# Patient Record
Sex: FEMALE | Race: ASIAN | NOT HISPANIC OR LATINO | Employment: FULL TIME | URBAN - NONMETROPOLITAN AREA
[De-identification: names, ages, dates, MRNs, and addresses within clinical notes are randomized per-mention and may not be internally consistent; named-entity substitution may affect disease eponyms.]

---

## 2022-05-03 ENCOUNTER — TELEPHONE (OUTPATIENT)
Dept: FAMILY MEDICINE CLINIC | Facility: CLINIC | Age: 62
End: 2022-05-03

## 2022-05-03 RX ORDER — LANCETS
1 EACH MISCELLANEOUS 2 TIMES DAILY
Qty: 200 EACH | Refills: 6 | Status: SHIPPED | OUTPATIENT
Start: 2022-05-03

## 2022-05-03 RX ORDER — LANCETS
EACH MISCELLANEOUS
COMMUNITY
End: 2022-05-03 | Stop reason: SDUPTHER

## 2022-05-03 NOTE — TELEPHONE ENCOUNTER
PT WALKED IN AND SHE IS NEEDING REFILLS ON THESE MEDICATIONS     ACCU-CHEK RACIEL PLUSTEST STRIPS     AND   ACCU-CHEK SOFTCLIX LANCETS    PLEASE ADVISE PT SHE IS ALMOST OUT

## 2022-05-14 ENCOUNTER — TELEPHONE (OUTPATIENT)
Dept: FAMILY MEDICINE CLINIC | Facility: CLINIC | Age: 62
End: 2022-05-14

## 2022-05-19 RX ORDER — METFORMIN HYDROCHLORIDE 750 MG/1
750 TABLET, EXTENDED RELEASE ORAL 2 TIMES DAILY
COMMUNITY
Start: 2022-02-10 | End: 2022-05-19 | Stop reason: SDUPTHER

## 2022-05-19 RX ORDER — METFORMIN HYDROCHLORIDE 750 MG/1
750 TABLET, EXTENDED RELEASE ORAL 2 TIMES DAILY
Qty: 180 TABLET | Refills: 1 | Status: SHIPPED | OUTPATIENT
Start: 2022-05-19 | End: 2022-05-23

## 2022-05-20 ENCOUNTER — TELEPHONE (OUTPATIENT)
Dept: FAMILY MEDICINE CLINIC | Facility: CLINIC | Age: 62
End: 2022-05-20

## 2022-05-23 RX ORDER — METFORMIN HYDROCHLORIDE 750 MG/1
TABLET, EXTENDED RELEASE ORAL
Qty: 90 TABLET | Refills: 0 | Status: SHIPPED | OUTPATIENT
Start: 2022-05-23

## 2022-06-29 RX ORDER — SIMVASTATIN 20 MG
TABLET ORAL
Qty: 90 TABLET | Refills: 1 | Status: SHIPPED | OUTPATIENT
Start: 2022-06-29

## 2022-08-12 RX ORDER — LISINOPRIL 5 MG/1
TABLET ORAL
Qty: 90 TABLET | OUTPATIENT
Start: 2022-08-12

## 2022-08-15 ENCOUNTER — TELEPHONE (OUTPATIENT)
Dept: FAMILY MEDICINE CLINIC | Facility: CLINIC | Age: 62
End: 2022-08-15

## 2022-08-15 NOTE — TELEPHONE ENCOUNTER
Caller: Calvin Kerns    Relationship: Self    Best call back number: [phone]    Requested Prescriptions:   LISINOPRIL 5MG     Pharmacy where request should be sent: [facility] [address] [place] [address], [place] - [address] [facility] AT [facility] [address] - [phone]  - [phone]     Additional details provided by patient: PATIENT HAS 1 DAY SUPPLY LEFT    Does the patient have less than a 3 day supply:  [x] Yes  [] No    Richelle Guajardo Rep   08/15/22 16:16 EDT

## 2022-08-16 RX ORDER — LISINOPRIL 5 MG/1
TABLET ORAL
COMMUNITY
Start: 2022-05-10 | End: 2022-08-16 | Stop reason: SDUPTHER

## 2022-08-16 RX ORDER — LISINOPRIL 5 MG/1
5 TABLET ORAL DAILY
Qty: 30 TABLET | Refills: 0 | Status: SHIPPED | OUTPATIENT
Start: 2022-08-16 | End: 2022-09-12

## 2022-08-16 NOTE — TELEPHONE ENCOUNTER
Caller: Calvin Kerns    Relationship: Self    Best call back number: [phone]    What medication are you requesting: LISINOPRIL 5 MG ONE TABLET DAILY AT A 90 DAY SUPPLY     What are your current symptoms: MAINTENANCE MEDICATION      How long have you been experiencing symptoms: UNSURE - THE PATIENT REPORTS SHE HAS BEEN ON THIS MEDICATION FOR A FEW YEARS AND HER PREVIOUS PCP PRESCRIBED THIS FOR HER. THE PATIENT MADE AN APPOINTMENT FOR HER  PHYSICAL FOR 09/28/2022 AND IS REQUESTING THIS MEDICATION IN THE MEANTIME.       Have you had these symptoms before:    [x] Yes  [] No    Have you been treated for these symptoms before:   [x] Yes  [] No    If a prescription is needed, what is your preferred pharmacy and phone number: [facility] - [place], [place] - [address] [facility] AT [facility] [address] & [address] - [phone]  - [phone] FX     Additional notes:    PLEASE CALL THE PATIENT IF ANY QUESTIONS -[phone]

## 2022-09-12 RX ORDER — LISINOPRIL 5 MG/1
TABLET ORAL
Qty: 30 TABLET | Refills: 0 | Status: SHIPPED | OUTPATIENT
Start: 2022-09-12

## 2023-03-08 ENCOUNTER — OFFICE VISIT (OUTPATIENT)
Dept: FAMILY MEDICINE CLINIC | Facility: CLINIC | Age: 63
End: 2023-03-08
Payer: COMMERCIAL

## 2023-03-08 VITALS
HEART RATE: 84 BPM | BODY MASS INDEX: 25.05 KG/M2 | SYSTOLIC BLOOD PRESSURE: 130 MMHG | HEIGHT: 61 IN | WEIGHT: 132.7 LBS | OXYGEN SATURATION: 98 % | TEMPERATURE: 98 F | DIASTOLIC BLOOD PRESSURE: 72 MMHG

## 2023-03-08 DIAGNOSIS — R31.0 GROSS HEMATURIA: ICD-10-CM

## 2023-03-08 DIAGNOSIS — R10.9 ACUTE RIGHT FLANK PAIN: Primary | ICD-10-CM

## 2023-03-08 PROBLEM — E11.9 DIABETES MELLITUS: Status: ACTIVE | Noted: 2023-03-08

## 2023-03-08 PROBLEM — R03.0 ELEVATED BLOOD PRESSURE READING: Status: ACTIVE | Noted: 2023-03-08

## 2023-03-08 LAB
BILIRUB BLD-MCNC: NEGATIVE MG/DL
CLARITY, POC: CLEAR
COLOR UR: YELLOW
EXPIRATION DATE: ABNORMAL
GLUCOSE UR STRIP-MCNC: NEGATIVE MG/DL
KETONES UR QL: NEGATIVE
LEUKOCYTE EST, POC: NEGATIVE
Lab: ABNORMAL
NITRITE UR-MCNC: NEGATIVE MG/ML
PH UR: 7 [PH] (ref 5–8)
PROT UR STRIP-MCNC: NEGATIVE MG/DL
RBC # UR STRIP: ABNORMAL /UL
SP GR UR: 1.01 (ref 1–1.03)
UROBILINOGEN UR QL: ABNORMAL

## 2023-03-08 PROCEDURE — 81003 URINALYSIS AUTO W/O SCOPE: CPT | Performed by: FAMILY MEDICINE

## 2023-03-08 PROCEDURE — 96372 THER/PROPH/DIAG INJ SC/IM: CPT | Performed by: FAMILY MEDICINE

## 2023-03-08 PROCEDURE — 99213 OFFICE O/P EST LOW 20 MIN: CPT | Performed by: FAMILY MEDICINE

## 2023-03-08 RX ORDER — TAMSULOSIN HYDROCHLORIDE 0.4 MG/1
1 CAPSULE ORAL DAILY
Qty: 30 CAPSULE | Refills: 0 | Status: SHIPPED | OUTPATIENT
Start: 2023-03-08

## 2023-03-08 RX ORDER — DORZOLAMIDE HCL 20 MG/ML
SOLUTION/ DROPS OPHTHALMIC
COMMUNITY
Start: 2023-01-15

## 2023-03-08 RX ORDER — KETOROLAC TROMETHAMINE 30 MG/ML
60 INJECTION, SOLUTION INTRAMUSCULAR; INTRAVENOUS ONCE
Status: COMPLETED | OUTPATIENT
Start: 2023-03-08 | End: 2023-03-08

## 2023-03-08 RX ADMIN — KETOROLAC TROMETHAMINE 60 MG: 30 INJECTION, SOLUTION INTRAMUSCULAR; INTRAVENOUS at 08:46

## 2023-03-08 NOTE — ASSESSMENT & PLAN NOTE
I am concerned for nephrolithiasis with 2 urinalyses with blood only.  She received Toradol in office today and will have KUB done in office to further evaluate.  She was instructed to go to the ED should symptoms acutely worsen.

## 2023-03-08 NOTE — PROGRESS NOTES
Date: 2023   Patient Name: Calvin Kerns  : 1960   MRN: [id number]     Chief Complaint:    Chief Complaint   Patient presents with   • Flank Pain     Right side, radiates into back       History of Present Illness: Calvin Kerns is a 62 y.o. female who is here today for Right flank pain.  Symptoms started about 1 week ago and she reports right flank pain radiating to the anterior abdomen towards her groin.  She was seen at urgent care and urinalysis had large amount of blood.  She reports symptoms have continued to worsen and pain has become unbearable at times.  She has no history of nephrolithiasis and denies dysuria, urinary frequency or urgency.           Review of Systems:   Review of Systems   Constitutional: Negative for fever and unexpected weight loss.   Cardiovascular: Negative for chest pain.   Gastrointestinal: Negative for abdominal pain.   Genitourinary: Positive for flank pain. Negative for dysuria, frequency, pelvic pain, urgency and urinary incontinence.   Neurological: Positive for weakness. Negative for numbness.       Past Medical History:   Past Medical History:   Diagnosis Date   • Diabetes mellitus (HCC)    • Hyperlipidemia    • Hypertension        Past Surgical History:   Past Surgical History:   Procedure Laterality Date   • HYSTERECTOMY         Family History: History reviewed. No pertinent family history.    Social History:   Social History     Socioeconomic History   • Marital status:    Tobacco Use   • Smoking status: Never   • Smokeless tobacco: Never   Vaping Use   • Vaping Use: Never used   Substance and Sexual Activity   • Alcohol use: Never   • Drug use: Never   • Sexual activity: Yes     Partners: Male       Medications:     Current Outpatient Medications:   •  Accu-Chek Softclix Lancets lancets, 1 each by Other route 2 (Two) Times a Day. Use as instructed, Disp: 200 each, Rfl: 6  •  dorzolamide (TRUSOPT) 2 % ophthalmic solution, , Disp: , Rfl:   •   "glucose blood test strip, 1 each by Other route 2 (Two) Times a Day. Use as instructed, Disp: 200 each, Rfl: 6  •  lisinopril (PRINIVIL,ZESTRIL) 5 MG tablet, TAKE ONE TABLET BY MOUTH DAILY, Disp: 30 tablet, Rfl: 0  •  metFORMIN ER (GLUCOPHAGE-XR) 750 MG 24 hr tablet, TAKE ONE TABLET BY MOUTH DAILY WITH EVENING MEAL, Disp: 90 tablet, Rfl: 0  •  simvastatin (ZOCOR) 20 MG tablet, TAKE ONE TABLET BY MOUTH EVERY EVENING, Disp: 90 tablet, Rfl: 1  No current facility-administered medications for this visit.    Allergies:   No Known Allergies      Physical Exam:  Vital Signs:   Vitals:    03/08/23 0810   BP: 130/72   BP Location: Left arm   Patient Position: Sitting   Cuff Size: Adult   Pulse: 84   Temp: 98 °F (36.7 °C)   TempSrc: Temporal   SpO2: 98%   Weight: 60.2 kg (132 lb 11.2 oz)   Height: 153.7 cm (60.5\")  Comment: pt reported     Body mass index is 25.49 kg/m².     Physical Exam  Vitals and nursing note reviewed.   Constitutional:       Appearance: Normal appearance.   Cardiovascular:      Rate and Rhythm: Normal rate and regular rhythm.      Heart sounds: Normal heart sounds. No murmur heard.  Pulmonary:      Effort: Pulmonary effort is normal.      Breath sounds: Normal breath sounds. No wheezing.   Abdominal:      General: Bowel sounds are normal.      Palpations: Abdomen is soft.      Tenderness: There is right CVA tenderness. There is no left CVA tenderness.   Neurological:      Mental Status: She is alert and oriented to person, place, and time. Mental status is at baseline.   Psychiatric:         Mood and Affect: Mood normal.         Behavior: Behavior normal.           Assessment/Plan:   Diagnoses and all orders for this visit:    1. Acute right flank pain (Primary)  Assessment & Plan:  I am concerned for nephrolithiasis with 2 urinalyses with blood only.  She received Toradol in office today and will have KUB done in office to further evaluate.  She was instructed to go to the ED should symptoms acutely " worsen.    Orders:  -     POC Urinalysis Dipstick, Automated  -     XR Abdomen KUB; Future  -     ketorolac (TORADOL) injection 60 mg    2. Gross hematuria  -     XR Abdomen KUB; Future         Follow Up:   Return if symptoms worsen or fail to improve.    Elvi Martinez, DO  [facility] [place] [place] [facility]

## 2023-03-10 ENCOUNTER — TELEPHONE (OUTPATIENT)
Dept: FAMILY MEDICINE CLINIC | Facility: CLINIC | Age: 63
End: 2023-03-10

## 2023-03-10 NOTE — TELEPHONE ENCOUNTER
Caller: Calvin Kerns    Relationship: Self    Best call back number: [phone]    What test was performed: CT SCAN     When was the test performed: 3/9/23    Where was the test performed: IN OFFICE